# Patient Record
(demographics unavailable — no encounter records)

---

## 2024-10-11 NOTE — HISTORY OF PRESENT ILLNESS
[FreeTextEntry1] : DM, f/u in 5 weeks  [de-identified] : DM(uncontrolled?). Last visit HbA1C 11.9. Given lantus 30 units and humalog 10units pre-meal. F/u in 5 weeks.

## 2024-10-11 NOTE — HEALTH RISK ASSESSMENT
[No falls in past year] : Patient reported no falls in the past year [0] : 2) Feeling down, depressed, or hopeless: Not at all (0) [Patient/Caregiver not ready to engage] : , patient/caregiver not ready to engage [Never] : Never [[id number]] : 0

## 2024-10-11 NOTE — ASSESSMENT
[FreeTextEntry1] : DM2- persistent hyperglycemia? - HbA1c trend: 11 -> 11.9 (in August) -> 14 (in October)  - Pt states fasting glucose was around 120. Sometimes 200 -300.  - Ajusted Lantus to 40 units  - F/u HbA1c in 5 weeks ( August - November)

## 2024-10-11 NOTE — PLAN
[FreeTextEntry1] : Plan: 1. POCT-HbA1C 14.  2. Pt states he is complaint with meds  3. DM diet consult  4. Increase lantus to 40 units bedtime  5. F/u in 5 weeks.

## 2024-10-11 NOTE — HEALTH RISK ASSESSMENT
[No falls in past year] : Patient reported no falls in the past year [0] : 2) Feeling down, depressed, or hopeless: Not at all (0) [Patient/Caregiver not ready to engage] : , patient/caregiver not ready to engage [Never] : Never [[id number]] : 0

## 2024-10-11 NOTE — COUNSELING
[Fall prevention counseling provided] : Fall prevention counseling provided [Behavioral health counseling provided] : Behavioral health counseling provided [FreeTextEntry2] : c/w DM diet

## 2024-10-11 NOTE — HISTORY OF PRESENT ILLNESS
[FreeTextEntry1] : DM, f/u in 5 weeks  [de-identified] : DM(uncontrolled?). Last visit HbA1C 11.9. Given lantus 30 units and humalog 10units pre-meal. F/u in 5 weeks.